# Patient Record
Sex: FEMALE | Race: WHITE | NOT HISPANIC OR LATINO | URBAN - METROPOLITAN AREA
[De-identification: names, ages, dates, MRNs, and addresses within clinical notes are randomized per-mention and may not be internally consistent; named-entity substitution may affect disease eponyms.]

---

## 2023-09-18 ENCOUNTER — APPOINTMENT (OUTPATIENT)
Dept: URBAN - METROPOLITAN AREA SURGERY 17 | Age: 88
Setting detail: DERMATOLOGY
End: 2023-09-19

## 2023-09-18 VITALS
TEMPERATURE: 97.4 F | RESPIRATION RATE: 14 BRPM | HEIGHT: 60 IN | DIASTOLIC BLOOD PRESSURE: 84 MMHG | HEART RATE: 78 BPM | SYSTOLIC BLOOD PRESSURE: 134 MMHG | WEIGHT: 175 LBS

## 2023-09-18 DIAGNOSIS — Z85.828 PERSONAL HISTORY OF OTHER MALIGNANT NEOPLASM OF SKIN: ICD-10-CM

## 2023-09-18 DIAGNOSIS — D22 MELANOCYTIC NEVI: ICD-10-CM

## 2023-09-18 DIAGNOSIS — L81.4 OTHER MELANIN HYPERPIGMENTATION: ICD-10-CM

## 2023-09-18 DIAGNOSIS — L57.0 ACTINIC KERATOSIS: ICD-10-CM

## 2023-09-18 DIAGNOSIS — L82.1 OTHER SEBORRHEIC KERATOSIS: ICD-10-CM

## 2023-09-18 DIAGNOSIS — Z71.89 OTHER SPECIFIED COUNSELING: ICD-10-CM

## 2023-09-18 PROBLEM — C44.329 SQUAMOUS CELL CARCINOMA OF SKIN OF OTHER PARTS OF FACE: Status: ACTIVE | Noted: 2023-09-18

## 2023-09-18 PROBLEM — D22.39 MELANOCYTIC NEVI OF OTHER PARTS OF FACE: Status: ACTIVE | Noted: 2023-09-18

## 2023-09-18 PROCEDURE — 17312 MOHS ADDL STAGE: CPT

## 2023-09-18 PROCEDURE — 99203 OFFICE O/P NEW LOW 30 MIN: CPT | Mod: 57

## 2023-09-18 PROCEDURE — 17311 MOHS 1 STAGE H/N/HF/G: CPT

## 2023-09-18 PROCEDURE — OTHER CONSULTATION FOR MOHS SURGERY: OTHER

## 2023-09-18 PROCEDURE — OTHER COUNSELING: OTHER

## 2023-09-18 PROCEDURE — 14041 TIS TRNFR F/C/C/M/N/A/G/H/F: CPT

## 2023-09-18 PROCEDURE — OTHER SURGICAL DECISION MAKING: OTHER

## 2023-09-18 PROCEDURE — OTHER MOHS SURGERY: OTHER

## 2023-09-18 PROCEDURE — OTHER MIPS QUALITY: OTHER

## 2023-09-18 ASSESSMENT — LOCATION DETAILED DESCRIPTION DERM
LOCATION DETAILED: RIGHT CENTRAL MALAR CHEEK
LOCATION DETAILED: LEFT INFERIOR MEDIAL FOREHEAD
LOCATION DETAILED: LEFT MEDIAL MALAR CHEEK
LOCATION DETAILED: MEDIAL FRONTAL SCALP

## 2023-09-18 ASSESSMENT — LOCATION SIMPLE DESCRIPTION DERM
LOCATION SIMPLE: FRONTAL SCALP
LOCATION SIMPLE: LEFT FOREHEAD
LOCATION SIMPLE: RIGHT CHEEK
LOCATION SIMPLE: LEFT CHEEK

## 2023-09-18 ASSESSMENT — LOCATION ZONE DERM
LOCATION ZONE: FACE
LOCATION ZONE: SCALP

## 2023-09-18 NOTE — PROCEDURE: MOHS SURGERY
-Received incoming phone call from pt at [phone]    -According to the pt, she as been working through her PTSD and physical trauma. She has been going to therapy and practicing skills she's been taught. The pt is now experiencing physical symptoms when remembering these traumatic events. She says her body will start twitching.The pt has been experiencing this since March and says it usually happens when she's calm and relaxed. She said this is not an urgent matter, but would like to discuss it with the provider. The pt is willing to be seen earlier than her upcoming appt on 8/20 if needed. She can be reached at the number provided anytime today. If the provider is unable to discuss with her today, she can be reached on Monday anytime between 3 pm and 5 pm. Message routed to provider.   Chonodrocutaneous Helical Advancement Flap Text: The defect edges were debeveled with a #15 scalpel blade.  Given the location of the defect and the proximity to free margins a chondrocutaneous helical advancement flap was deemed most appropriate.  Using a sterile surgical marker, the appropriate advancement flap was drawn incorporating the defect and placing the expected incisions within the relaxed skin tension lines where possible.    The area thus outlined was incised deep to adipose tissue with a #15 scalpel blade.  The skin margins were undermined to an appropriate distance in all directions utilizing iris scissors.

## 2023-09-18 NOTE — PROCEDURE: CONSULTATION FOR MOHS SURGERY
Detail Level: Detailed
Body Location Override (Optional - Billing Will Still Be Based On Selected Body Map Location If Applicable): left temple
Size Of Lesion: 2.1
X Size Of Lesion In Cm (Optional): 2
Name Of The Referring Provider For Procedure: Skyler Noel PA-C
Incorporate Mauc In Note: Yes

## 2023-09-18 NOTE — PROCEDURE: SURGICAL DECISION MAKING
Risk Assessment Explanation (Does Not Render In The Note): Clinical determination of the probability and/or consequences of an event, such as surgery. Clinical assessment of the level of risk is affected by the nature of the event under consideration for the patient. Modifier 57 is used to indicate an Evaluation and Management (E/M) service resulted in the initial decision to perform surgery either the day before a major surgery (90 day global) or the day of a major surgery.
Date Of Surgery - Today Or Tomorrow?: today
Initial Decision For Surgery?: Yes
Complexity (Necessary For Coding; Major - 90 Day Global With Some Exceptions; Minor - 10 Day Global): major
Discussion: After the surgical removal of the tumor was completed an evaluation was performed to determine the best and most appropriate method of surgical reconstruction. Considered options included second intention, layered closure, delayed closure, flap/tissue rearrangement, and various forms of skin grafts. The patient was found to have a particular skin cancer and defect that due to its size and location will require either a flap and tissue transfer or a full thickness skin graft to enable complete closure that restores anatomic function without local distortion and preserves and/or restores cosmetic appearance that is not possible with a layered closure. The decision was made today to proceed with reconstruction utilizing a skin graft or flap for this particular cancer and defect taking into account the entire patient’s medical history, health status, mental status, age, medications, and desire for cosmetic outcome weighed against the particular flap or graft chosen with its own associated risks, benefits, and possible complications. The type of cancer (and its associated risk factors) that caused the surgical defect and the defect itself were also both considered in the decision making process. This procedure and its risks, benefits, side effects, possible complications, and alternatives have been discussed with the patient in detail. This evaluation and management decision was completely separate from the Mohs procedure that removed the tumor.

## 2023-09-18 NOTE — PROCEDURE: MOHS SURGERY
Xolair Pregnancy And Lactation Text: This medication is Pregnancy Category B and is considered safe during pregnancy. This medication is excreted in breast milk. Information: Selecting Yes will display possible errors in your note based on the variables you have selected. This validation is only offered as a suggestion for you. PLEASE NOTE THAT THE VALIDATION TEXT WILL BE REMOVED WHEN YOU FINALIZE YOUR NOTE. IF YOU WANT TO FAX A PRELIMINARY NOTE YOU WILL NEED TO TOGGLE THIS TO 'NO' IF YOU DO NOT WANT IT IN YOUR FAXED NOTE.

## 2023-09-18 NOTE — PROCEDURE: MOHS SURGERY
Pt seen by Dr. Purcell for genetic counseling and testing. Lab drawn with 23 g butterfly needle in RIght hand x 2 attempts. Sent to [facility]. Pt informed she would receive a phone call when test results.     Length To Time In Minutes Device Was In Place: 10

## 2023-09-18 NOTE — PROCEDURE: MOHS SURGERY
Beclomethasone (By breathing)   Beclomethasone (be-kloe-METH-a-sone)  Prevents asthma attacks  This medicine is a corticosteroid  Brand Name(s): QVAR, Qvar, Qvar Redihaler   There may be other brand names for this medicine  When This Medicine Should Not Be Used: This medicine is not right for everyone  Do not use it if you had an allergic reaction to beclomethasone or while you are having an asthma attack  How to Use This Medicine:   Liquid Under Pressure, Powder Under Pressure, Spray  · Take your medicine as directed  Your dose may need to be changed several times to find what works best for you  · Read and follow the patient instructions that come with this medicine  Talk to your doctor or pharmacist if you have any questions  · You will use this medicine with a device called a metered-dose inhaler  The inhaler fits on the medicine canister and turns the medicine into a fine spray that you breathe in through your mouth and to your lungs  You may be told to use a spacer, which is a tube that is placed between the inhaler and your mouth  Your caregiver will show you how to use your inhaler and the spacer (if needed)  · Do not remove the canister from the actuator  · To inhale this medicine, breathe out fully, trying to get as much air out of the lungs as possible  Put the mouthpiece just in front of your mouth with the canister upright  Do not breathe into the inhaler  · Open your mouth and breathe in slowly and deeply (like yawning), and at the same time firmly press down on the top of the canister once  · Hold your breath for about 5 to 10 seconds, and then breathe out slowly  · When you have finished all your inhalations, rinse your mouth out with water  Do not swallow the water  · Wipe the mouthpiece dry with a cloth or tissue  Do not wash or put any part of the inhaler in water  · QVAR® inhaler:   ? Remove the cap and look at the mouthpiece to make sure it is clean    ? Do not remove the canister from the actuator  ? Prime the inhaler before you use it for the first time, or if it has not been used for 10 days or more  Prime it by pointing it away from your face and spraying into the air 2 times  ? If you are supposed to use more than one puff, wait 1 to 2 minutes before inhaling the second puff  Repeat these steps for the next puff, starting with shaking the inhaler  · QVAR® Redihaler:   ? Do not shake or prime the inhaler  Do not use it with a spacer or volume holding chamber  ? Do not open the white cap of the inhaler or leave it open unless you are ready to use it  Close the cap before each use or if it has been left open for more than 2 minutes  ? The dose counter will turn red when the inhaler has 20 or fewer doses left  Stop using the inhaler when it reaches 0   · Missed dose: Take a dose as soon as you remember  If it is almost time for your next dose, wait until then and take a regular dose  Do not take extra medicine to make up for a missed dose  If you miss a dose of QVAR® Redihaler, skip the missed dose and go back to your regular dosing schedule  · Store the canister at room temperature, away from heat and direct light  Do not freeze  Do not keep this medicine inside a car where it could be exposed to extreme heat or cold  Do not poke holes in the canister or throw it into a fire, even if the canister is empty  · Throw away the inhaler when the expiration date passes or the counter reads 0  Drugs and Foods to Avoid:      Ask your doctor or pharmacist before using any other medicine, including over-the-counter medicines, vitamins, and herbal products  Warnings While Using This Medicine:   · Tell your doctor if you are pregnant or breastfeeding, or if you have cataracts, glaucoma, or osteoporosis  Tell your doctor if you have any immune system problems or infections, including herpes simplex in your eye or tuberculosis   Tell your doctor right away if you are exposed to measles or chickenpox  · This medicine may cause the following problems:  ? Increased risk of infection, including fungus infection in the mouth (thrush)  ? Adrenal gland problems  ? Increased trouble breathing right after use (paradoxical bronchospasm)  ? Slow growth in children  ? Low bone mineral density, which may lead to osteoporosis  ? Glaucoma or cataracts  · This medicine will not stop an asthma attack that has already started  You should have another medicine to use in case of an acute asthma attack  · If any of your asthma medicines do not seem to be working as well as usual, call your doctor right away  Do not change your doses or stop using your medicines without asking your doctor  · You may need to use this medicine for 1 to 4 weeks before your asthma starts to get better  Call your doctor if your symptoms do not improve or if they get worse  · Keep all medicine out of the reach of children  Never share your medicine with anyone  Possible Side Effects While Using This Medicine:   Call your doctor right away if you notice any of these side effects:  · Allergic reaction: Itching or hives, swelling in your face or hands, swelling or tingling in your mouth or throat, chest tightness, trouble breathing  · Color changes on the skin, dark freckles, easy bruising, muscle weakness, round or puffy face  · Eye pain or vision changes  · Fever, chills, cough, stuffy or runny nose, sneezing, sore throat, body aches  · Tiredness, weakness, nausea and vomiting, dizziness  · Worsening of breathing problems  If you notice these less serious side effects, talk with your doctor:   · Headache  · Sores or white patches in your mouth or throat, pain when eating or swallowing  · Weight changes (in children)  If you notice other side effects that you think are caused by this medicine, tell your doctor  Call your doctor for medical advice about side effects   You may report side effects to FDA at 9-219-FDA-3132    © Copyright [employer] 2022 Information is for [employer] use only and may not be sold, redistributed or otherwise used for commercial purposes  The above information is an  only  It is not intended as medical advice for individual conditions or treatments  Talk to your doctor, nurse or pharmacist before following any medical regimen to see if it is safe and effective for you  Mart-1 - Negative Histology Text: MART-1 staining demonstrates a normal density and pattern of melanocytes along the dermal-epidermal junction. The surgical margins are negative for tumor cells.

## 2023-09-18 NOTE — PROCEDURE: MOHS SURGERY

## 2023-09-18 NOTE — PROCEDURE: MIPS QUALITY
New patient or new problem visit    Chief Complaint   Patient presents with   • Lumbar Spine - Pain     HPI: She complains of an 8 month history of bilateral hip and leg pain and low back pain and equal amounts.  She is failed to improve with chiropractic and chiropractic physical therapy.  The pain radiates into the buttocks and down into the legs and feet.  It is moderate to severe constant and aching.    PFSH: See chart- reviewed    Review of Systems   Constitutional: Negative for chills, fever and unexpected weight change.   HENT: Negative for trouble swallowing and voice change.    Eyes: Negative for visual disturbance.   Respiratory: Negative for cough and shortness of breath.    Cardiovascular: Negative for chest pain and leg swelling.   Gastrointestinal: Negative for abdominal pain, nausea and vomiting.   Endocrine: Negative for cold intolerance and heat intolerance.   Genitourinary: Negative for difficulty urinating, frequency and urgency.   Skin: Negative for rash and wound.   Allergic/Immunologic: Negative for immunocompromised state.   Neurological: Positive for headaches. Negative for weakness and numbness.   Hematological: Does not bruise/bleed easily.   Psychiatric/Behavioral: Positive for sleep disturbance (difficulty sleeping). Negative for dysphoric mood. The patient is not nervous/anxious.        PE: Constitutional: Vital signs above-noted.  Awake, alert and oriented    Psychiatric: Affect and insight do not appear grossly disturbed.    Pulmonary: Breathing is unlabored, color is good.    Skin: Warm, dry and normal turgor    Cardiac:  pedal pulses intact.  No edema.    Eyesight and hearing appear adequate for examination purposes      Musculoskeletal:  There is mild tenderness to percussion and palpation of the spine. Motion appears undisturbed.  Posture is unremarkable to coronal and sagittal inspection.    The skin about the area is intact.  There is no palpable or visible deformity.  There is 
no local spasm.       Neurologic:   Reflexes are 2+ and symmetrical in the patellae and achilles.   Motor function is undisturbed in quadriceps, EHL, and gastrocnemius      Sensation appears symmetrically intact to light touch   .  In the bilateral lower extremities there is no evidence of atrophy.   Clonus is absent..  Gait appears undisturbed.  SLR test equivocal bilaterally worse on the left than the right      MEDICAL DECISION MAKING    XRAY: Plain film x-rays the lumbar spine are essentially unremarkable perhaps slight disc space narrowing at L5-S1 relative to the remainder of the otherwise well maintained disc heights.  No comparison views are available    Other: n/a    Impression: Sciatica probably from lumbar herniated disc or combined stenosis    Plan: I recommend MRI of the lumbar spine with an eye toward epidural injections.  We'll try steroids meantime.  I don't think we can send her to PT as much as she's hurting, at least it won't be likely to be successful.  A call her with results of the MRI scan.  
Detail Level: Detailed
Quality 226: Preventive Care And Screening: Tobacco Use: Screening And Cessation Intervention: Patient screened for tobacco use and is an ex/non-smoker

## 2023-09-18 NOTE — PROCEDURE: COUNSELING
Detail Level: Generalized
Nicotinamide Supplementation Recommendations: Nicotinamide 500mg BID was recommended to help reduce the risk of BCC & SCC by 23% and AK’s from 14-35%. The usage of this OTC form of vitamin B3 was reviewed with the patient due to their history of skin cancer. The medication must be taken continuously to maintain the benefit.
Sunscreen Recommendations: Sunscreen therapy (SPF 30 or higher) was recommended to the patient due to their significantly higher risk of developing another skin cancer. They were counseled that this is up to 10X greater with 1 cancer and even higher with 2 or more. It was explained that failure to use sunscreen protection can result in an even higher risk of skin cancer due to increased genetic hits in sun-damaged skin cells. Sunscreen use by preventing more skin cancers can keep patients out of the higher risk group with 2 or more cancers. Physical blocking sunscreens that contain zinc/titanium are preferred over chemical sunscreens. Proper and adequate application and reapplication after exercise or bathing was also reviewed. The patient was also counseled on sun safety and other techniques for sun avoidance.
Detail Level: Zone
Sunscreen Recommendations: Sunscreen therapy (SPF 30 or higher) was recommended to the patient due to their significantly higher risk of developing another skin cancer. They were counseled that this is up to 10X greater with 1 cancer and even higher with 2 or more. It was explained that failure to use sunscreen protection can result in an even higher risk of skin cancer due to increased genetic hits in sun damaged skin cells. Sunscreen use by preventing more skin cancers can keep patients out of the higher risk group with 2 or more cancers. Physical blocking sunscreens that contain zinc/titanium are preferred over chemical sunscreens. Proper and adequate application and reapplication after exercise or bathing was also reviewed. The patient was also counseled on sun safety and other techniques for sun avoidance.

## 2023-09-18 NOTE — PROCEDURE: MOHS SURGERY
